# Patient Record
Sex: FEMALE | Race: WHITE | NOT HISPANIC OR LATINO | Employment: UNEMPLOYED | URBAN - METROPOLITAN AREA
[De-identification: names, ages, dates, MRNs, and addresses within clinical notes are randomized per-mention and may not be internally consistent; named-entity substitution may affect disease eponyms.]

---

## 2022-07-23 PROBLEM — G47.33 OSA (OBSTRUCTIVE SLEEP APNEA): Status: ACTIVE | Noted: 2022-07-23

## 2022-07-23 PROBLEM — I10 HYPERTENSION: Status: ACTIVE | Noted: 2022-07-23

## 2022-07-23 PROBLEM — J96.01 ACUTE HYPOXEMIC RESPIRATORY FAILURE: Status: ACTIVE | Noted: 2022-07-23

## 2022-07-23 PROBLEM — J44.1 COPD EXACERBATION: Status: ACTIVE | Noted: 2022-07-23

## 2022-07-23 PROBLEM — R07.89 OTHER CHEST PAIN: Status: ACTIVE | Noted: 2022-07-23

## 2022-07-24 PROBLEM — M54.40 CHRONIC BILATERAL LOW BACK PAIN WITH SCIATICA: Status: ACTIVE | Noted: 2022-07-24

## 2022-07-24 PROBLEM — J44.9 COPD (CHRONIC OBSTRUCTIVE PULMONARY DISEASE): Status: ACTIVE | Noted: 2022-07-23

## 2022-07-24 PROBLEM — G89.29 CHRONIC BILATERAL LOW BACK PAIN WITH SCIATICA: Status: ACTIVE | Noted: 2022-07-24

## 2022-07-25 PROBLEM — R07.89 OTHER CHEST PAIN: Status: RESOLVED | Noted: 2022-07-23 | Resolved: 2022-07-25

## 2022-08-03 ENCOUNTER — PATIENT OUTREACH (OUTPATIENT)
Dept: ADMINISTRATIVE | Facility: HOSPITAL | Age: 47
End: 2022-08-03

## 2022-08-03 NOTE — LETTER
AUTHORIZATION FOR RELEASE OF   CONFIDENTIAL INFORMATION    Dear [facility] Medical Records,    We are seeing Jayda Schaefer, date of birth 1975, in the clinic at [facility]. Radha Conner NP is the patient's PCP. Jayda Schaefer has an outstanding lab/procedure at the time we reviewed her chart. In order to help keep her health information updated, she has authorized us to request the following medical record(s):        (  )  MAMMOGRAM                                      (  )  COLONOSCOPY      (  )  PAP SMEAR                                          (  )  OUTSIDE LAB RESULTS     (  )  DEXA SCAN                                          (  )  DM EYE EXAM            (  )  FOOT EXAM                                          ( X )  ENTIRE RECORD     (  )  OUTSIDE IMMUNIZATIONS                 (  )  _______________        Please fax records to [facility],  [phone].       If you have any questions, please contact , Yady at [phone].           Patient Name: Jayda Schaefer  : 1975  Patient Phone #: [phone]

## 2022-08-03 NOTE — PROGRESS NOTES
Health Maintenance Due   Topic Date Due    Cervical Cancer Screening  Never done    COVID-19 Vaccine (1) Never done    Mammogram  03/17/2021    Colorectal Cancer Screening  Never done   faxed MILANA's to the following  Received records from Dr. Kalpesh Whitlock's office.8/4/22  Chart review    1. Dr. Kalpesh Whitlock in [place], [place] TL#:  [phone] (This was her PCP) Fax [phone]  2. Dr. Lulu Craft in [place], [place], TL#:  [phone] (Orthopedic Specialist)      Fax [phone]  3. Dr. Haylee Lopez (Orthopedic Surgeon) in [place], [place]:  [phone] Fax [phone]  4. [facility] in [place], [place]: [phone] Fax [phone]

## 2022-08-03 NOTE — LETTER
AUTHORIZATION FOR RELEASE OF   CONFIDENTIAL INFORMATION    Dear Dr. Kalpesh Whitlock Medical Records,    We are seeing Jayda Schaefer, date of birth 1975, in the clinic at [facility]. Radha Conner NP is the patient's PCP. Jayda Schaefer has an outstanding lab/procedure at the time we reviewed her chart. In order to help keep her health information updated, she has authorized us to request the following medical record(s):        (  )  MAMMOGRAM                                      (  )  COLONOSCOPY      (  )  PAP SMEAR                                          (  )  OUTSIDE LAB RESULTS     (  )  DEXA SCAN                                          (  )  DM EYE EXAM            (  )  FOOT EXAM                                          (X )  ENTIRE RECORD     (  )  OUTSIDE IMMUNIZATIONS                 (  )  _______________        Please fax records to [facility],  [phone].       If you have any questions, please contact , Yady at [phone].           Patient Name: Jayda Schaefer  : 1975  Patient Phone #: [phone]

## 2022-08-03 NOTE — LETTER
AUTHORIZATION FOR RELEASE OF   CONFIDENTIAL INFORMATION    Dear Dr Lulu Chow Medical Records,    We are seeing Jayda Schaefer, date of birth 1975, in the clinic at [facility]. Radha Conner NP is the patient's PCP. Jayda Schaefer has an outstanding lab/procedure at the time we reviewed her chart. In order to help keep her health information updated, she has authorized us to request the following medical record(s):        (  )  MAMMOGRAM                                      (  )  COLONOSCOPY      (  )  PAP SMEAR                                          (  )  OUTSIDE LAB RESULTS     (  )  DEXA SCAN                                          (  )  DM EYE EXAM            (  )  FOOT EXAM                                          ( X )  ENTIRE RECORD     (  )  OUTSIDE IMMUNIZATIONS                 (  )          Please fax records to [facility],  [phone]       If you have any questions, please contact Yady at [phone].           Patient Name: Jayda Schaefer  : 1975  Patient Phone #: [phone]

## 2022-08-03 NOTE — LETTER
AUTHORIZATION FOR RELEASE OF   CONFIDENTIAL INFORMATION    Dear Dr Haylee Lopez Medical Records,    We are seeing Jayda Schaefer, date of birth 1975, in the clinic at [facility]. Radha Conner NP is the patient's PCP. Jayda Schaefer has an outstanding lab/procedure at the time we reviewed her chart. In order to help keep her health information updated, she has authorized us to request the following medical record(s):        (  )  MAMMOGRAM                                      (  )  COLONOSCOPY      (  )  PAP SMEAR                                          (  )  OUTSIDE LAB RESULTS     (  )  DEXA SCAN                                          (  )  DM EYE EXAM            (  )  FOOT EXAM                                          ( X )  ENTIRE RECORD     (  )  OUTSIDE IMMUNIZATIONS                 (  )  _______________        Please fax records to [facility],  [phone].       If you have any questions, please contact , Yady at [phone].           Patient Name: Jayda Schaefer  : 1975  Patient Phone #: [phone]

## 2022-08-22 PROBLEM — E66.01 CLASS 3 SEVERE OBESITY WITH BODY MASS INDEX (BMI) OF 40.0 TO 44.9 IN ADULT: Status: ACTIVE | Noted: 2022-08-22

## 2022-08-22 PROBLEM — Z23 NEED FOR TDAP VACCINATION: Status: ACTIVE | Noted: 2022-08-22

## 2022-08-22 PROBLEM — F17.200 SMOKER: Status: ACTIVE | Noted: 2022-08-22

## 2022-08-22 PROBLEM — Z12.31 ENCOUNTER FOR MAMMOGRAM TO ESTABLISH BASELINE MAMMOGRAM: Status: ACTIVE | Noted: 2022-08-22

## 2022-08-22 PROBLEM — R93.1 ABNORMAL ECHOCARDIOGRAM: Status: ACTIVE | Noted: 2022-08-22

## 2022-08-22 PROBLEM — Z01.419 ENCOUNTER FOR WELL WOMAN EXAM WITH ROUTINE GYNECOLOGICAL EXAM: Status: ACTIVE | Noted: 2022-08-22

## 2022-08-22 PROBLEM — Z92.89 HOSPITALIZATION WITHIN LAST 30 DAYS: Status: ACTIVE | Noted: 2022-08-22

## 2022-08-22 PROBLEM — Z12.11 ENCOUNTER FOR SCREENING COLONOSCOPY FOR NON-HIGH-RISK PATIENT: Status: ACTIVE | Noted: 2022-08-22

## 2022-08-22 PROBLEM — Z23 PNEUMOCOCCAL VACCINATION ADMINISTERED AT CURRENT VISIT: Status: ACTIVE | Noted: 2022-08-22

## 2022-08-22 PROBLEM — Z00.00 HEALTHCARE MAINTENANCE: Status: ACTIVE | Noted: 2022-08-22

## 2022-11-21 PROBLEM — Z00.00 HEALTHCARE MAINTENANCE: Status: RESOLVED | Noted: 2022-08-22 | Resolved: 2022-11-21
